# Patient Record
Sex: MALE | Race: BLACK OR AFRICAN AMERICAN | NOT HISPANIC OR LATINO | Employment: FULL TIME | URBAN - METROPOLITAN AREA
[De-identification: names, ages, dates, MRNs, and addresses within clinical notes are randomized per-mention and may not be internally consistent; named-entity substitution may affect disease eponyms.]

---

## 2017-06-05 ENCOUNTER — HOSPITAL ENCOUNTER (EMERGENCY)
Facility: OTHER | Age: 27
Discharge: HOME | End: 2017-06-05
Attending: EMERGENCY MEDICINE

## 2017-06-05 VITALS
TEMPERATURE: 99 F | SYSTOLIC BLOOD PRESSURE: 122 MMHG | RESPIRATION RATE: 18 BRPM | HEART RATE: 63 BPM | OXYGEN SATURATION: 100 % | DIASTOLIC BLOOD PRESSURE: 74 MMHG | WEIGHT: 204 LBS | HEIGHT: 71 IN | BODY MASS INDEX: 28.56 KG/M2

## 2017-06-05 DIAGNOSIS — M25.472 ANKLE SWELLING, LEFT: ICD-10-CM

## 2017-06-05 DIAGNOSIS — M25.471 ANKLE SWELLING, RIGHT: Primary | ICD-10-CM

## 2017-06-05 DIAGNOSIS — M25.579 ANKLE PAIN: ICD-10-CM

## 2017-06-05 PROCEDURE — 99283 EMERGENCY DEPT VISIT LOW MDM: CPT

## 2017-06-05 RX ORDER — NAPROXEN 375 MG/1
375 TABLET ORAL 2 TIMES DAILY WITH MEALS
Qty: 30 TABLET | Refills: 0 | Status: SHIPPED | OUTPATIENT
Start: 2017-06-05

## 2017-06-05 NOTE — ED PROVIDER NOTES
Encounter Date: 6/5/2017       History     Chief Complaint   Patient presents with    Ankle Pain     feet and ankles hurt after a day of work      Review of patient's allergies indicates:  No Known Allergies  Gavin Hicks is a 27 y.o. male who presents to the Emergency Department with  ankle pain and swelling.  Patient states that it began today his feet are swollen ankles are swollen when he goes to bed puts his feet up in the morning they're not swollen.  Patient worsens  and stands on his feet all day.  Patient denies any recent injuries.  Patient states his symptoms going on since January.  He does not have a primary care physician.      The history is provided by the patient.     History reviewed. No pertinent past medical history.  History reviewed. No pertinent surgical history.  History reviewed. No pertinent family history.  Social History   Substance Use Topics    Smoking status: Current Some Day Smoker    Smokeless tobacco: Not on file    Alcohol use No     Review of Systems   Constitutional: Negative for fever.   HENT: Negative for sore throat.    Respiratory: Negative for shortness of breath.    Cardiovascular: Negative for chest pain.   Gastrointestinal: Negative for nausea.   Genitourinary: Negative for dysuria.   Musculoskeletal: Positive for joint swelling. Negative for back pain.   Skin: Negative for rash.   Neurological: Negative for weakness.   Hematological: Does not bruise/bleed easily.   All other systems reviewed and are negative.      Physical Exam     Initial Vitals [06/05/17 1039]   BP Pulse Resp Temp SpO2   122/74 63 18 98.7 °F (37.1 °C) 100 %     Physical Exam    Nursing note and vitals reviewed.  Constitutional: He appears well-developed and well-nourished. He is not diaphoretic. No distress.   HENT:   Head: Normocephalic and atraumatic.   Right Ear: External ear normal.   Left Ear: External ear normal.   Nose: Nose normal.   Mouth/Throat: Oropharynx is clear and moist.    Eyes: EOM are normal. Pupils are equal, round, and reactive to light.   Neck: Normal range of motion. Neck supple.   Cardiovascular: Normal rate, regular rhythm, normal heart sounds and intact distal pulses. Exam reveals no friction rub.    No murmur heard.  Pulmonary/Chest: Breath sounds normal. No stridor. No respiratory distress. He has no wheezes. He has no rhonchi. He has no rales.   Musculoskeletal: Normal range of motion. He exhibits tenderness. He exhibits no edema.        Right hip: Normal.        Left hip: Normal.        Right knee: Normal.        Left knee: Normal.        Right ankle: He exhibits swelling. He exhibits normal range of motion, no deformity, no laceration and normal pulse. Tenderness.        Left ankle: He exhibits swelling. He exhibits normal range of motion, no deformity, no laceration and normal pulse. No tenderness.        Right lower leg: Normal. He exhibits no tenderness, no bony tenderness, no swelling, no edema and no deformity.        Left lower leg: He exhibits no tenderness, no bony tenderness, no swelling, no edema and no deformity.        Feet:    Neurological: He is alert. He has normal strength and normal reflexes. He displays normal reflexes. No cranial nerve deficit or sensory deficit.   Skin: Skin is warm and dry. Capillary refill takes less than 2 seconds. No abscess noted. No erythema.   Psychiatric: He has a normal mood and affect.         ED Course   Procedures  Labs Reviewed - No data to display     Imaging Results          X-Ray Ankle Complete Right (Final result)  Result time 06/05/17 10:26:53    Final result by Trenton Trevino MD (06/05/17 10:26:53)                 Narrative:    Study Desc:   XR ANKLE COMPLETE 3 VIEW RIGHT  Clinical History: Ankle pain.     Comparison: None.     Findings:  There are AP, oblique, and lateral images of the right ankle.  No acute fracture or   dislocation.  The ankle mortise and talar dome appear intact.  There is talar beaking    with extensive degenerative changes and/or sequela of old trauma along the superior   margin of the talonavicular joint.  Moderate right ankle effusion.  Normal bony   alignment.  No radiopaque foreign body.     If there is persistent clinical concern for an acute process, may consider short interval   follow-up right ankle radiograph examination in 7-10 days or right ankle MRI examination   for further evaluation.     Impression:  1.  No acute fracture or dislocation injury identified of the right ankle.     2.  Moderate ankle effusion.     3.  Extensive degenerative changes and/or sequela of old trauma identified at the right   talonavicular joint.     SL: 24 Signed by: Trenton Trevino MD  2017-06-05 10:26:51 [CDT]                                                   ED Course       Medical decision making   Chief complaint: feet and ankles swell after standing all day at work.  Treatment in the ED Physical Exam, and x-ray.    Discussed need to wear support hose/compression socks at work, and imaging results.    Fill and take prescriptions as directed.  Return to the ED if symptoms worsen or do not resolve.   Answered questions and discussed discharge plan.    Patient feels much better and is ready for discharge.  Follow up with PCP in 1 days.    Clinical Impression:   The primary encounter diagnosis was Ankle swelling, right. Diagnoses of Ankle pain and Ankle swelling, left were also pertinent to this visit.          Erica Morfin DO  06/05/17 6521

## 2018-04-13 ENCOUNTER — HOSPITAL ENCOUNTER (EMERGENCY)
Facility: OTHER | Age: 28
Discharge: HOME | End: 2018-04-13
Attending: EMERGENCY MEDICINE

## 2018-04-13 VITALS
RESPIRATION RATE: 18 BRPM | BODY MASS INDEX: 27.25 KG/M2 | WEIGHT: 184 LBS | SYSTOLIC BLOOD PRESSURE: 118 MMHG | HEIGHT: 69 IN | DIASTOLIC BLOOD PRESSURE: 71 MMHG | HEART RATE: 76 BPM | TEMPERATURE: 98 F | OXYGEN SATURATION: 100 %

## 2018-04-13 DIAGNOSIS — K52.9 GASTROENTERITIS: Primary | ICD-10-CM

## 2018-04-13 PROCEDURE — 63600175 PHARM REV CODE 636 W HCPCS: Performed by: EMERGENCY MEDICINE

## 2018-04-13 PROCEDURE — 99283 EMERGENCY DEPT VISIT LOW MDM: CPT

## 2018-04-13 PROCEDURE — 25000003 PHARM REV CODE 250: Performed by: EMERGENCY MEDICINE

## 2018-04-13 RX ORDER — ONDANSETRON 4 MG/1
TABLET, ORALLY DISINTEGRATING ORAL
Status: DISCONTINUED
Start: 2018-04-13 | End: 2018-04-13 | Stop reason: HOSPADM

## 2018-04-13 RX ORDER — PROMETHAZINE HYDROCHLORIDE 25 MG/1
25 SUPPOSITORY RECTAL EVERY 6 HOURS PRN
Qty: 10 SUPPOSITORY | Refills: 0 | Status: SHIPPED | OUTPATIENT
Start: 2018-04-13

## 2018-04-13 RX ORDER — DICYCLOMINE HYDROCHLORIDE 20 MG/1
20 TABLET ORAL
Qty: 20 TABLET | Refills: 0 | Status: SHIPPED | OUTPATIENT
Start: 2018-04-13 | End: 2018-05-13

## 2018-04-13 RX ORDER — KETOROLAC TROMETHAMINE 30 MG/ML
30 INJECTION, SOLUTION INTRAMUSCULAR; INTRAVENOUS
Status: COMPLETED | OUTPATIENT
Start: 2018-04-13 | End: 2018-04-13

## 2018-04-13 RX ORDER — DICYCLOMINE HYDROCHLORIDE 10 MG/ML
20 INJECTION INTRAMUSCULAR
Status: COMPLETED | OUTPATIENT
Start: 2018-04-13 | End: 2018-04-13

## 2018-04-13 RX ORDER — FAMOTIDINE 20 MG/1
20 TABLET, FILM COATED ORAL 2 TIMES DAILY
Qty: 20 TABLET | Refills: 0 | Status: SHIPPED | OUTPATIENT
Start: 2018-04-13 | End: 2019-04-13

## 2018-04-13 RX ORDER — ONDANSETRON 8 MG/1
8 TABLET, ORALLY DISINTEGRATING ORAL EVERY 6 HOURS PRN
Qty: 30 TABLET | Refills: 0 | Status: SHIPPED | OUTPATIENT
Start: 2018-04-13 | End: 2018-04-15

## 2018-04-13 RX ORDER — ONDANSETRON 4 MG/1
8 TABLET, ORALLY DISINTEGRATING ORAL ONCE
Status: COMPLETED | OUTPATIENT
Start: 2018-04-13 | End: 2018-04-13

## 2018-04-13 RX ADMIN — KETOROLAC TROMETHAMINE 30 MG: 30 INJECTION, SOLUTION INTRAMUSCULAR at 09:04

## 2018-04-13 RX ADMIN — ONDANSETRON 8 MG: 4 TABLET, ORALLY DISINTEGRATING ORAL at 09:04

## 2018-04-13 RX ADMIN — DICYCLOMINE HYDROCHLORIDE 20 MG: 20 INJECTION, SOLUTION INTRAMUSCULAR at 09:04

## 2018-04-13 NOTE — ED PROVIDER NOTES
Encounter Date: 4/13/2018       History     Chief Complaint   Patient presents with    Abdominal Pain     Pt reports abdominal pain, nausea, vomiting and diarrhea x 2 days. pt denies taking any medicine for symptoms.     Nausea    Vomiting    Diarrhea     Gavin Hicks is a 28 y.o. male who presents to the Emergency Department with  nausea, vomiting, and diarrhea for last 2 days.  Patient reports generalized belly cramps.  No specific spot hurting.  Patient states he needs something to get his nausea and vomiting stops.  Patient has been around sick people.  She does not want IV or blood work today.  Patient does not want IV fluids.      The history is provided by the patient.   Emesis    This is a new problem. The current episode started two days ago. The problem occurs 2 - 4 times per day. The problem has been unchanged. The emesis has an appearance of stomach contents. Associated symptoms include abdominal pain (cramping), chills and diarrhea. Pertinent negatives include no cough, no fever, no sweats and no URI. Risk factors include ill contacts.     Review of patient's allergies indicates:  No Known Allergies  History reviewed. No pertinent past medical history.  History reviewed. No pertinent surgical history.  History reviewed. No pertinent family history.  Social History   Substance Use Topics    Smoking status: Current Some Day Smoker    Smokeless tobacco: Not on file    Alcohol use No     Review of Systems   Constitutional: Positive for chills. Negative for fever.   HENT: Negative for sore throat.    Respiratory: Negative for cough and shortness of breath.    Cardiovascular: Negative for chest pain.   Gastrointestinal: Positive for abdominal pain (cramping), diarrhea, nausea and vomiting. Negative for constipation.   Genitourinary: Negative for difficulty urinating and dysuria.   Musculoskeletal: Negative for back pain.   Skin: Negative for rash.   Neurological: Negative for weakness.    Hematological: Does not bruise/bleed easily.   All other systems reviewed and are negative.      Physical Exam     Initial Vitals [04/13/18 0918]   BP Pulse Resp Temp SpO2   118/71 76 18 98.3 °F (36.8 °C) 100 %      MAP       86.67         Physical Exam    Nursing note and vitals reviewed.  Constitutional: He appears well-developed and well-nourished. He is not diaphoretic. No distress.   HENT:   Head: Normocephalic and atraumatic.   Right Ear: External ear normal.   Left Ear: External ear normal.   Nose: Nose normal.   Mouth/Throat: Oropharynx is clear and moist.   Eyes: EOM are normal. Pupils are equal, round, and reactive to light. Right eye exhibits no discharge. Left eye exhibits no discharge.   Neck: Normal range of motion. Neck supple.   Cardiovascular: Normal rate, regular rhythm, normal heart sounds and intact distal pulses. Exam reveals no gallop and no friction rub.    No murmur heard.  Pulmonary/Chest: Breath sounds normal. No respiratory distress. He has no wheezes. He has no rhonchi. He has no rales. He exhibits no tenderness.   Abdominal: Soft. Bowel sounds are normal. He exhibits no distension and no mass. There is no tenderness. There is no rebound and no guarding.   Musculoskeletal: Normal range of motion.   Lymphadenopathy:     He has no cervical adenopathy.   Neurological: He is alert and oriented to person, place, and time. No cranial nerve deficit or sensory deficit.   Skin: Skin is warm. No rash noted. No pallor.   Psychiatric: He has a normal mood and affect.         ED Course   Procedures  Labs Reviewed - No data to display                          Medical decision making   Chief complaint: nausea, vomiting, and diarrhea for last 2 days.  Patient declined IV, bloodwork, and IV medications.  Patient feels like he just needs a little something by mouth and he'll be better  Differential diagnosis: Gastritis, gastroenteritis, and viral illness  Treatment in the ED Physical Exam, Zofran,  Pepcid, and Bentyl  Patient reports doing much better after medication.    Discussed outpatient treatment plan.  Take medicines as prescribed.  Return if nausea vomiting is not controlled    Fill and take prescriptions as directed.  Return to the ED if symptoms worsen or do not resolve.   Answered questions and discussed discharge plan.    Patient feels much better and is ready for discharge.  Follow up with PCP in 1 days.       Clinical Impression:   The encounter diagnosis was Gastroenteritis.                           Erica Morfin DO  04/13/18 1042

## 2019-01-30 ENCOUNTER — HOSPITAL ENCOUNTER (EMERGENCY)
Facility: HOSPITAL | Age: 29
Discharge: HOME | End: 2019-01-30
Attending: EMERGENCY MEDICINE

## 2019-01-30 VITALS
WEIGHT: 190 LBS | HEART RATE: 68 BPM | HEIGHT: 71 IN | TEMPERATURE: 98 F | RESPIRATION RATE: 16 BRPM | SYSTOLIC BLOOD PRESSURE: 125 MMHG | DIASTOLIC BLOOD PRESSURE: 73 MMHG | OXYGEN SATURATION: 100 % | BODY MASS INDEX: 26.6 KG/M2

## 2019-01-30 DIAGNOSIS — R51.9 NONINTRACTABLE EPISODIC HEADACHE, UNSPECIFIED HEADACHE TYPE: ICD-10-CM

## 2019-01-30 DIAGNOSIS — J01.90 ACUTE NON-RECURRENT SINUSITIS, UNSPECIFIED LOCATION: Primary | ICD-10-CM

## 2019-01-30 DIAGNOSIS — H65.93 OTITIS MEDIA WITH EFFUSION, BILATERAL: ICD-10-CM

## 2019-01-30 DIAGNOSIS — J02.9 PHARYNGITIS, UNSPECIFIED ETIOLOGY: ICD-10-CM

## 2019-01-30 LAB
CTP QC/QA: YES
S PYO RRNA THROAT QL PROBE: NEGATIVE

## 2019-01-30 PROCEDURE — 99284 EMERGENCY DEPT VISIT MOD MDM: CPT | Mod: ER

## 2019-01-30 PROCEDURE — 87147 CULTURE TYPE IMMUNOLOGIC: CPT

## 2019-01-30 PROCEDURE — 87880 STREP A ASSAY W/OPTIC: CPT | Mod: ER

## 2019-01-30 PROCEDURE — 87081 CULTURE SCREEN ONLY: CPT

## 2019-01-30 PROCEDURE — 25000003 PHARM REV CODE 250: Mod: ER | Performed by: NURSE PRACTITIONER

## 2019-01-30 RX ORDER — NAPROXEN 250 MG/1
500 TABLET ORAL
Status: COMPLETED | OUTPATIENT
Start: 2019-01-30 | End: 2019-01-30

## 2019-01-30 RX ORDER — FLUTICASONE PROPIONATE 50 MCG
1 SPRAY, SUSPENSION (ML) NASAL 2 TIMES DAILY PRN
Qty: 15 G | Refills: 0 | Status: SHIPPED | OUTPATIENT
Start: 2019-01-30

## 2019-01-30 RX ORDER — LORATADINE 10 MG/1
10 TABLET ORAL DAILY
Qty: 14 TABLET | Refills: 0 | Status: SHIPPED | OUTPATIENT
Start: 2019-01-30 | End: 2019-02-13

## 2019-01-30 RX ADMIN — NAPROXEN 500 MG: 250 TABLET ORAL at 10:01

## 2019-01-30 NOTE — DISCHARGE INSTRUCTIONS
Please return to the Emergency Department for any new or worsening symptoms including: worsening pain in the ears/face/head, fever, chest pain, shortness of breath, loss of consciousness, dizziness, weakness, or any other concerns.     Please follow up with your Primary Care Provider within in the week. If you do not have one, you may contact the one listed on your discharge paperwork or you may also call the [facility] Appointment Desk at [phone] to schedule an appointment with one.     Please take all medication as prescribed. Tylenol or Ibuprofen as needed for headache.     Flonase and Claritin to help with nasal congestion and headache. Please stay well-hydrated, drink plenty of fluids.  You can also try an over-the-counter decongestant to help with nasal congestion and headache.

## 2019-01-30 NOTE — ED PROVIDER NOTES
Encounter Date: 1/30/2019       History     Chief Complaint   Patient presents with    Otalgia     pt reports he has been having right ear pain x 2 days. denies taking any medicine for pain    Headache     pt reports he has been having a headache x 3 days but denies taking any medicine for pain. denies N/V/D or any other associated symptoms     CC:  Ear pain, sore throat, and headache    HPI: Gavin Hicks, a 29 y.o. male that presents to the ED for a 2-3 day history of gradual onset frontal headache that is described as constant and sharp.  No associated photophobia or phonophobia.  No dizziness or lightheadedness.  No nausea or vomiting.  His reports a 2 day history of right ear pain is been constant since onset.  Reports no drainage. Also reports a 1 day history of sore throat that began this morning upon waking.  He reports no cough.  He does reports nasal congestion and runny nose.  He reports subjective fevers with no documented temperature.  No medications or treatment attempted prior to arrival.  No sick contacts. He is also requesting a work note.           The history is provided by the patient. No  was used.     Review of patient's allergies indicates:  No Known Allergies  History reviewed. No pertinent past medical history.  History reviewed. No pertinent surgical history.  History reviewed. No pertinent family history.  Social History     Tobacco Use    Smoking status: Current Some Day Smoker   Substance Use Topics    Alcohol use: No    Drug use: Yes     Types: Marijuana     Review of Systems   Constitutional: Positive for fever (subjective).   HENT: Positive for congestion, ear pain (R), rhinorrhea, sinus pain and sore throat. Negative for trouble swallowing.    Eyes: Negative for photophobia, pain and visual disturbance.   Respiratory: Negative for cough and shortness of breath.    Cardiovascular: Negative for chest pain.   Gastrointestinal: Negative for abdominal pain, nausea  and vomiting.   Musculoskeletal: Negative for back pain and neck pain.   Skin: Negative for rash and wound.   Neurological: Positive for headaches. Negative for seizures, syncope, speech difficulty and numbness.   Psychiatric/Behavioral: Negative for confusion.       Physical Exam     Initial Vitals [01/30/19 0954]   BP Pulse Resp Temp SpO2   121/76 70 17 98.2 °F (36.8 °C) 100 %      MAP       --         Physical Exam    Nursing note and vitals reviewed.  Constitutional: He appears well-developed and well-nourished. He is not diaphoretic. He is cooperative.  Non-toxic appearance. He does not have a sickly appearance. He does not appear ill. No distress.   HENT:   Head: Normocephalic and atraumatic.   Right Ear: External ear normal. Tympanic membrane is not erythematous. A middle ear effusion is present.   Left Ear: External ear normal. Tympanic membrane is not erythematous. A middle ear effusion is present.   Nose: Mucosal edema and rhinorrhea present. Right sinus exhibits maxillary sinus tenderness (mild) and frontal sinus tenderness (mild). Left sinus exhibits maxillary sinus tenderness (mild) and frontal sinus tenderness (mild).   Mouth/Throat: Oropharynx is clear and moist.   Boggy nasal mucosa with rhinorrhea. Posterior pharyngeal cobblestoning present.   Eyes: Conjunctivae and EOM are normal.   Neck: Trachea normal, normal range of motion and phonation normal. Neck supple. Normal range of motion present. No neck rigidity.   Cardiovascular: Normal rate and regular rhythm.   Pulses:       Radial pulses are 2+ on the right side, and 2+ on the left side.   Pulmonary/Chest: No tachypnea and no bradypnea. No respiratory distress. He has no wheezes. He has no rhonchi. He has no rales.   Abdominal: He exhibits no distension.   Musculoskeletal: Normal range of motion.   Lymphadenopathy:        Right cervical: No superficial cervical adenopathy present.       Left cervical: No superficial cervical adenopathy present.    Neurological: He is alert and oriented to person, place, and time. He has normal strength. No sensory deficit. Coordination and gait normal. GCS eye subscore is 4. GCS verbal subscore is 5. GCS motor subscore is 6.   Skin: Skin is warm and dry. Capillary refill takes less than 2 seconds. No rash noted.         ED Course   Procedures  Labs Reviewed   CULTURE, STREP A,  THROAT   POCT RAPID STREP A          Imaging Results    None                APC / Resident Notes:   This is an evaluation of a 29 y.o. male that presents to the Emergency Department for sore throat, runny nose, earache, and headache. The patient is a non-toxic, afebrile, and well appearing male. On physical exam ears and pharynx are without evidence of infection.  Effusions noted bilateral TMs.  Appears well hydrated with moist mucus membranes.  Posterior pharyngeal cobblestoning present.  Neck soft and supple with no meningeal signs or cervical lymphadenopathy. Breath sounds are clear and equal bilaterally with no adventitious breath sounds, tachypnea or respiratory distress with room air pulse ox of 100% and no evidence of hypoxia.  Mild sinus tenderness to palpation over the frontal maxillary sinuses.  Rhinorrhea with boggy nasal mucosa.Vital Signs Are Reassuring. RESULTS:  Rapid strep:  Negative.     My overall impression is sinusitis, otitis media with effusion without infection, pharyngitis, headache. I considered, but at this time, do not suspect OM, OE, strep pharyngitis, meningitis, pneumonia, or acute bacterial sinusitis.  Given the length of symptoms and mild nature I suspect likely viral sinusitis.    ED Course:  Naproxen. D/C Meds:  Flonase, Claritin, naproxen. Additional D/C Information:  Hydration. The diagnosis, treatment plan, instructions for follow-up and reevaluation with PCP as well as ED return precautions were discussed and understanding was verbalized. All questions or concerns have been addressed. RUDI Gallardo, FNP-C                ED Course as of Jan 30 1051   Wed Jan 30, 2019   1046 Reassessment:  Prior to discharge, patient reports his headache is improving.  [AF]      ED Course User Index  [AF] PRISCILA Das     Clinical Impression:   The primary encounter diagnosis was Acute non-recurrent sinusitis, unspecified location. Diagnoses of Otitis media with effusion, bilateral, Nonintractable episodic headache, unspecified headache type, and Pharyngitis, unspecified etiology were also pertinent to this visit.      Disposition:   Disposition: Discharged  Condition: Stable                        PRISCILA Das  01/30/19 1051

## 2019-02-01 LAB — BACTERIA THROAT CULT: NORMAL
